# Patient Record
Sex: MALE | Race: OTHER | HISPANIC OR LATINO | URBAN - METROPOLITAN AREA
[De-identification: names, ages, dates, MRNs, and addresses within clinical notes are randomized per-mention and may not be internally consistent; named-entity substitution may affect disease eponyms.]

---

## 2018-02-10 ENCOUNTER — EMERGENCY (EMERGENCY)
Facility: HOSPITAL | Age: 23
LOS: 1 days | Discharge: HOME | End: 2018-02-10
Attending: EMERGENCY MEDICINE
Payer: SELF-PAY

## 2018-02-10 VITALS
OXYGEN SATURATION: 98 % | DIASTOLIC BLOOD PRESSURE: 62 MMHG | HEART RATE: 57 BPM | TEMPERATURE: 98 F | SYSTOLIC BLOOD PRESSURE: 111 MMHG | RESPIRATION RATE: 18 BRPM

## 2018-02-10 PROCEDURE — 73140 X-RAY EXAM OF FINGER(S): CPT | Mod: 26,RT

## 2018-02-10 PROCEDURE — 90715 TDAP VACCINE 7 YRS/> IM: CPT

## 2018-02-10 PROCEDURE — 99283 EMERGENCY DEPT VISIT LOW MDM: CPT | Mod: 25

## 2018-02-10 PROCEDURE — 99284 EMERGENCY DEPT VISIT MOD MDM: CPT

## 2018-02-10 PROCEDURE — 90471 IMMUNIZATION ADMIN: CPT

## 2018-02-10 PROCEDURE — 73140 X-RAY EXAM OF FINGER(S): CPT

## 2018-02-10 RX ORDER — IBUPROFEN 200 MG
800 TABLET ORAL ONCE
Qty: 0 | Refills: 0 | Status: COMPLETED | OUTPATIENT
Start: 2018-02-10 | End: 2018-02-10

## 2018-02-10 RX ORDER — IBUPROFEN 200 MG
1 TABLET ORAL
Qty: 30 | Refills: 0 | OUTPATIENT
Start: 2018-02-10

## 2018-02-10 RX ORDER — TETANUS TOXOID, REDUCED DIPHTHERIA TOXOID AND ACELLULAR PERTUSSIS VACCINE, ADSORBED 5; 2.5; 8; 8; 2.5 [IU]/.5ML; [IU]/.5ML; UG/.5ML; UG/.5ML; UG/.5ML
0.5 SUSPENSION INTRAMUSCULAR ONCE
Qty: 0 | Refills: 0 | Status: COMPLETED | OUTPATIENT
Start: 2018-02-10 | End: 2018-02-10

## 2018-02-10 RX ORDER — CEPHALEXIN 500 MG
1 CAPSULE ORAL
Qty: 20 | Refills: 0 | OUTPATIENT
Start: 2018-02-10 | End: 2018-02-14

## 2018-02-10 RX ADMIN — Medication 800 MILLIGRAM(S): at 20:07

## 2018-02-10 RX ADMIN — Medication 800 MILLIGRAM(S): at 18:37

## 2018-02-10 RX ADMIN — TETANUS TOXOID, REDUCED DIPHTHERIA TOXOID AND ACELLULAR PERTUSSIS VACCINE, ADSORBED 0.5 MILLILITER(S): 5; 2.5; 8; 8; 2.5 SUSPENSION INTRAMUSCULAR at 18:37

## 2018-02-10 NOTE — ED PROVIDER NOTE - MUSCULOSKELETAL, MLM
R ring finger nail bed complete subungual hematoma. Bleeding from cuticle. Small 5 mm laceration on finger pad. No ttp to the rest of hand and other fingers. R ring finger nail with nearly complete subungual hematoma. Bleeding from cuticle. Small 5 mm laceration on finger pad. No ttp to the rest of hand and other fingers.

## 2018-02-10 NOTE — ED PROVIDER NOTE - CARE PLAN
Principal Discharge DX:	Finger fracture, right  Secondary Diagnosis:	Subungual hematoma of digit of hand

## 2018-02-10 NOTE — ED ADULT NURSE REASSESSMENT NOTE - NS ED NURSE REASSESS COMMENT FT1
Finger washed and irrigated with normal saline. Finger placed in finger splint. Patient discharged with medications and surgery MD follow up. +

## 2018-02-10 NOTE — ED PROVIDER NOTE - OBJECTIVE STATEMENT
21 y/o M pt w/ no PMHx c/o R ring finger injury x today. States that a piece of iron bar fell on his finger at work. does not recall last tetanus. No pain anywhere else.  No other complaints. NKDA.

## 2023-05-31 NOTE — ED ADULT NURSE NOTE - ATTEMPT TO OOB
OB FOLLOW UP  CC- Here for care of pregnancy        Elma Lima is a 32 y.o.  16w1d patient being seen today for her obstetrical follow up visit. Patient reports no complaints. Has started low dose aspirin    Her prenatal care is complicated by (and status) : None  Patient Active Problem List   Diagnosis   • Herpes simplex type 2 (HSV-2) infection affecting pregnancy, antepartum   • History of gestational hypertension   • Rh negative, antepartum   • Pregnancy       Flu Status: Declines  Ultrasound Today: No    AFP: declines    ROS -   Patient Reports : No Problems  Patient Denies: Loss of Fluid, Vaginal Spotting, Vision Changes, Headaches, Nausea , Vomiting  and Contractions  Fetal Movement : absent  All other systems reviewed and are negative.       The additional following portions of the patient's history were reviewed and updated as appropriate: allergies and current medications.      I have reviewed and agree with the HPI, ROS, and historical information as entered above. Danya Lim MD          EXAM:     Prenatal Vitals  BP: 110/80  Weight: 75.3 kg (166 lb)   Fetal Heart Rate: +   Pelvic Exam:                    Assessment and Plan    Problem List Items Addressed This Visit        Gynecologic and Obstetric Problems    Herpes simplex type 2 (HSV-2) infection affecting pregnancy, antepartum    Overview     Plan suppression 36 wks            Other    History of gestational hypertension    Overview     Diagnosed in labor 38 wks  bASA at 12 w         Rh negative, antepartum    Pregnancy - Primary    Overview     Prev  term 7#8oz  cfDNA low risk         Relevant Orders    POC Glucose, Urine, Qualitative, Dipstick    POC Protein, Urine, Qualitative, Dipstick       1. Pregnancy at 16w1d  2. Fetal status reassuring.   3. Counseled on MSAFP alone in relation to OTD and placental issues.  - declines  4. Anatomy scan next visit.   5. Activity and Exercise discussed.  6. Patient is on Prenatal  vitamins  Return in about 1 month (around 6/30/2023) for F/U Prenatal, U/S Next Visit.    Danya Lim MD  05/31/2023   no